# Patient Record
Sex: MALE
[De-identification: names, ages, dates, MRNs, and addresses within clinical notes are randomized per-mention and may not be internally consistent; named-entity substitution may affect disease eponyms.]

---

## 2018-08-24 ENCOUNTER — HOSPITAL ENCOUNTER (EMERGENCY)
Dept: HOSPITAL 31 - C.ER | Age: 63
Discharge: LEFT BEFORE DISCHARGE | End: 2018-08-24
Payer: SELF-PAY

## 2018-08-24 VITALS — SYSTOLIC BLOOD PRESSURE: 117 MMHG | DIASTOLIC BLOOD PRESSURE: 77 MMHG | TEMPERATURE: 98.7 F | RESPIRATION RATE: 18 BRPM

## 2018-08-24 VITALS — OXYGEN SATURATION: 100 %

## 2018-08-24 VITALS — HEART RATE: 74 BPM

## 2018-08-24 VITALS — BODY MASS INDEX: 30.6 KG/M2

## 2018-08-24 DIAGNOSIS — K86.9: ICD-10-CM

## 2018-08-24 DIAGNOSIS — R10.9: Primary | ICD-10-CM

## 2018-08-24 LAB
ALBUMIN SERPL-MCNC: 3.6 G/DL (ref 3.5–5)
ALBUMIN/GLOB SERPL: 1.3 {RATIO} (ref 1–2.1)
ALT SERPL-CCNC: 54 U/L (ref 21–72)
AST SERPL-CCNC: 46 U/L (ref 17–59)
BASOPHILS # BLD AUTO: 0 K/UL (ref 0–0.2)
BASOPHILS NFR BLD: 0.3 % (ref 0–2)
BILIRUB UR-MCNC: NEGATIVE MG/DL
BUN SERPL-MCNC: 17 MG/DL (ref 9–20)
CALCIUM SERPL-MCNC: 8.7 MG/DL (ref 8.6–10.4)
EOSINOPHIL # BLD AUTO: 1 K/UL (ref 0–0.7)
EOSINOPHIL NFR BLD: 6 % (ref 0–4)
EOSINOPHIL NFR BLD: 8.4 % (ref 0–4)
ERYTHROCYTE [DISTWIDTH] IN BLOOD BY AUTOMATED COUNT: 13.1 % (ref 11.5–14.5)
GFR NON-AFRICAN AMERICAN: > 60
GLUCOSE UR STRIP-MCNC: NORMAL MG/DL
HGB BLD-MCNC: 9.8 G/DL (ref 12–18)
HYALINE CASTS #/AREA URNS LPF: (no result) /LPF (ref 0–2)
LEUKOCYTE ESTERASE UR-ACNC: (no result) LEU/UL
LIPASE: 134 U/L (ref 23–300)
LYMPHOCYTE: 7 % (ref 20–40)
LYMPHOCYTES # BLD AUTO: 0.8 K/UL (ref 1–4.3)
LYMPHOCYTES NFR BLD AUTO: 6.7 % (ref 20–40)
MCH RBC QN AUTO: 31.6 PG (ref 27–31)
MCHC RBC AUTO-ENTMCNC: 34.5 G/DL (ref 33–37)
MCV RBC AUTO: 91.6 FL (ref 80–94)
MONOCYTE: 6 % (ref 0–10)
MONOCYTES # BLD: 0.8 K/UL (ref 0–0.8)
MONOCYTES NFR BLD: 6.6 % (ref 0–10)
NEUTROPHILS # BLD: 9.8 K/UL (ref 1.8–7)
NEUTROPHILS NFR BLD AUTO: 75 % (ref 50–75)
NEUTROPHILS NFR BLD AUTO: 78 % (ref 50–75)
NEUTS BAND NFR BLD: 6 % (ref 0–2)
NRBC BLD AUTO-RTO: 0 % (ref 0–2)
PH UR STRIP: 6 [PH] (ref 5–8)
PLATELET # BLD EST: NORMAL 10*3/UL
PLATELET # BLD: 200 K/UL (ref 130–400)
PMV BLD AUTO: 8 FL (ref 7.2–11.7)
PROT UR STRIP-MCNC: NEGATIVE MG/DL
RBC # BLD AUTO: 3.1 MIL/UL (ref 4.4–5.9)
RBC # UR STRIP: NEGATIVE /UL
RBC MORPH BLD: NORMAL
SP GR UR STRIP: 1.02 (ref 1–1.03)
SQUAMOUS EPITHIAL: < 1 /HPF (ref 0–5)
TOTAL CELLS COUNTED BLD: 100
UROBILINOGEN UR-MCNC: 4 MG/DL (ref 0.2–1)
WBC # BLD AUTO: 12.5 K/UL (ref 4.8–10.8)

## 2018-08-24 PROCEDURE — 99285 EMERGENCY DEPT VISIT HI MDM: CPT

## 2018-08-24 PROCEDURE — 81001 URINALYSIS AUTO W/SCOPE: CPT

## 2018-08-24 PROCEDURE — 87086 URINE CULTURE/COLONY COUNT: CPT

## 2018-08-24 PROCEDURE — 80053 COMPREHEN METABOLIC PANEL: CPT

## 2018-08-24 PROCEDURE — 74177 CT ABD & PELVIS W/CONTRAST: CPT

## 2018-08-24 PROCEDURE — 82550 ASSAY OF CK (CPK): CPT

## 2018-08-24 PROCEDURE — 96374 THER/PROPH/DIAG INJ IV PUSH: CPT

## 2018-08-24 PROCEDURE — 85025 COMPLETE CBC W/AUTO DIFF WBC: CPT

## 2018-08-24 PROCEDURE — 83690 ASSAY OF LIPASE: CPT

## 2018-08-24 NOTE — C.PDOC
History Of Present Illness


62 y/o male with history of prostate problems presents to ED with c/o bilateral 

flank pain for 5 days associated with dark urine. Patient denies fever, chills, 

dysuria, hematuria, urinary frequency or any other complaints at this time. 


Time Seen by Provider: 08/24/18 09:24


Chief Complaint (Nursing): Male Genitourinary


History Per: Patient


History/Exam Limitations: no limitations


Onset/Duration Of Symptoms: Days


Current Symptoms Are (Timing): Still Present


Quality Of Discomfort: "Pain"





Past Medical History


Reviewed: Historical Data, Nursing Documentation, Vital Signs


Vital Signs: 


 Last Vital Signs











Temp  98.7 F   08/24/18 15:40


 


Pulse  74   08/24/18 15:40


 


Resp  18   08/24/18 15:40


 


BP  117/77   08/24/18 15:40


 


Pulse Ox  98   08/24/18 15:40














- Medical History


PMH: No Chronic Diseases


Surgical History: No Surg Hx


Family History: States: No Known Family Hx





- Social History


Hx Alcohol Use: No


Hx Substance Use: No





- Immunization History


Hx Tetanus Toxoid Vaccination: No


Hx Influenza Vaccination: No


Hx Pneumococcal Vaccination: No





Review Of Systems


Constitutional: Negative for: Fever, Chills


Gastrointestinal: Positive for: Other (flank pain).  Negative for: Nausea, 

Vomiting


Genitourinary: Negative for: Dysuria, Hematuria


Musculoskeletal: Negative for: Back Pain


Skin: Negative for: Rash





Physical Exam





- Physical Exam


Appears: Non-toxic, No Acute Distress


Skin: Warm, Dry, No Rash


Head: Atraumatic, Normacephalic


Eye(s): bilateral: Normal Inspection


Oral Mucosa: Moist


Neck: Supple


Cardiovascular: Rhythm Regular


Respiratory: Normal Breath Sounds, No Rales, No Rhonchi, No Wheezing


Gastrointestinal/Abdominal: Soft, Tenderness (RUQ/Right flank), No Guarding, No 

Rebound


Back: No CVA Tenderness, No Paraspinal Tenderness


Neurological/Psych: Oriented x3, Normal Speech, Normal Cognition





ED Course And Treatment





- Laboratory Results


Result Diagrams: 


 08/24/18 10:16





 08/24/18 10:16


O2 Sat by Pulse Oximetry: 100 (RA)


Pulse Ox Interpretation: Normal





Medical Decision Making


Medical Decision Making: 





pt with bilateral flank pain and dark urine; pt with right sided ab pain on 

exam. pt found to be anemic, denies any rectal bleeding or dark stool. will get 

ct scan to eval for any abdominal pathology





1437  ct results reviewed; pt with pancreatic mass and multiple metastatic 

nodules to liver, lungs. pt is visiting from [facility].  Discussed the results 

with patient and his niece. pt does not want to stay or be admitted. prefers to 

arrange soonest flight home and get medical care there. pt understands he will 

leave hospital AMA.  conversation had in front of patient's niece. pt will get 

copy cd of scan. scan results and lab results and rx for pain meds. 





AMA:


The patient declines admission, and wishes to leave the Emergency Department.  

This action is against my medical advice to the patient and the decision was 

made with informed refusal. The patient was told that admission is necessary 

and a full explanation of the rationale was given.  The risks of leaving were 

explained to the patient and include, but are not limited to, worsening of 

known or currently unknown conditions, permanent disability and death from 

undiagnosed or untreated conditions  The patient has the capacity to make this 

informed decision and understands the clinical situation and my explanation of 

the risks of leaving. The patient voluntarily accepts these risks, and a signed 

AMA form documenting our conversation was obtained. The patient was given the 

opportunity to ask questions and reconsider.  The patient was encouraged to 

return to the Emergency Department at any time for further care.








Disposition


Counseled Patient/Family Regarding: Studies Performed, Diagnosis, Need For 

Followup, Rx Given





- Disposition


Referrals: 


[facility] [Outside]


Disposition: AGAINST MEDICAL ADVICE


Disposition Time: 15:15


Condition: STABLE


Additional Instructions: 





Por favor, andie un seguimiento con un cirujano, gastroenterlogo y mdico-doc 

lo ms pronto posible a yates llegada a casa en [facility]. [place] Tylenol o Motrin 

para el dolor; Percocet para dolor severo. Regrese a ER en cualquier momento 

para bigg evaluacin adicional.





Please follow up with a surgeon, gastroenterologist and medicaldoctr as soon as 

possible upon your arrival home in [facility].  Take Tylenol or Motrin for pain; 

Percocet for severe pain. Return to ER at any time for further evaluation. 


Prescriptions: 


oxyCODONE/Acetaminophen [Percocet 5/325 mg Tab] 1 ea PO Q4 #20 tab


Instructions:  Acute Abdomen (Belly Pain), Adult (DC)


Forms:  Gen Discharge Inst Palestinian, [facility] (Palestinian)


Print Language: Hebrew





- Clinical Impression


Clinical Impression: 


 Mass of pancreas, Abdominal pain








- PA / NP / Resident Statement


MD/DO has reviewed & agrees with the documentation as recorded.





- Scribe Statement


The provider has reviewed the documentation as recorded by the Sandhyaibbonita Arauz





All medical record entries made by the Edith were at my direction and 

personally dictated by me. I have reviewed the chart and agree that the record 

accurately reflects my personal performance of the history, physical exam, 

medical decision making, and the department course for this patient. I have 

also personally directed, reviewed, and agree with the discharge instructions 

and disposition.

## 2018-08-24 NOTE — CT
Date of service: 



08/24/2018



PROCEDURE:  CT Abdomen and Pelvis with contrast



HISTORY:

right side ab pain. anemic



COMPARISON:

None.



TECHNIQUE:

CT scan of the abdomen and pelvis was performed after administration 

of intravenous contrast. Oral contrast was administered.  Coronal and 

sagittal reformatted images were obtained. Contrast dose: 



Radiation dose:



Total exam DLP = 506.71 mGy-cm.



This CT exam was performed using one or more of the following dose 

reduction techniques: Automated exposure control, adjustment of the 

mA and/or kV according to patient size, and/or use of iterative 

reconstruction technique.



FINDINGS:



LOWER THORAX:

There are multiple subcentimeter variable sized nodules in both 

visualized lungs, the largest in the right posterior lung base 

measures 8 mm. 



LIVER:

The liver is enlarged.  There are innumerable variable-sized 

hyperdense lesions in the liver, the largest in the posterior dome 

measures 2.9 x 2.4 cm. 



GALLBLADDER AND BILE DUCTS:

No calcified gallstones. 



PANCREAS:

There is an approximately 7.1 x 7.1 cm hypodense mass in the tail of 

the pancreas insinuating inon splenic hilum. Superiorly the mass also 

abuts the gastric cardia. The portal vein is encased in the region of 

the tail of the pancreas and in the splenic hilum.



SPLEEN:

Normal in size and appearance. 



ADRENALS:

No discrete nodule. 



KIDNEYS AND URETERS:

Normal in size with homogeneous enhancement. There are few simple 

cysts in both kidneys, the largest in the left lower pole measures 

1.5 cm. 



VASCULATURE:

No aortic aneurysm. 



BOWEL:

The small bowel loops are normal in caliber. The colon is 

unremarkable. No obstruction. No gross mural thickening. 



APPENDIX:

Normal appendix. 



PERITONEUM:

No free fluid. No free air. 



LYMPH NODES:

There are enlarged peripancreatic and retroperitoneal lymph nodes, 

the largest left para-aortic lymph node measures 3.0 cm. 



BLADDER:

Grossly normal in appearance. 



REPRODUCTIVE:

The prostate gland is normal in size. 



BONES:

No acute fracture. The L3 vertebral body and posterior elements are 

sclerotic and expanded without evidence for pathologic fracture. 



OTHER FINDINGS:

None.



IMPRESSION:

Findings are most compatible with large malignant neoplasm in the 

tail of the pancreas measuring approximately 7.1 x 7.1 cm with 

encasement of the portal vein in the region of the tail of the 

pancreas. 



Innumerable metastatic nodules in the liver, metastatic pulmonary 

nodules and metastatic peripancreatic and retroperitoneal 

lymphadenopathy as described above. 



Sclerotic and expansile L3 vertebral body and posterior elements 

could represent sclerotic metastasis.  The other differential 

consideration includes Paget's disease.